# Patient Record
Sex: MALE | Race: WHITE
[De-identification: names, ages, dates, MRNs, and addresses within clinical notes are randomized per-mention and may not be internally consistent; named-entity substitution may affect disease eponyms.]

---
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
## 2024-04-21 ENCOUNTER — HOSPITAL ENCOUNTER (EMERGENCY)
Dept: HOSPITAL 26 - MED | Age: 19
Discharge: HOME | End: 2024-04-21
Payer: COMMERCIAL

## 2024-04-21 VITALS
TEMPERATURE: 98.1 F | DIASTOLIC BLOOD PRESSURE: 76 MMHG | RESPIRATION RATE: 18 BRPM | SYSTOLIC BLOOD PRESSURE: 131 MMHG | OXYGEN SATURATION: 97 % | HEART RATE: 81 BPM

## 2024-04-21 VITALS
TEMPERATURE: 98.3 F | SYSTOLIC BLOOD PRESSURE: 133 MMHG | HEART RATE: 88 BPM | OXYGEN SATURATION: 99 % | DIASTOLIC BLOOD PRESSURE: 60 MMHG | RESPIRATION RATE: 20 BRPM

## 2024-04-21 VITALS — HEIGHT: 72 IN | WEIGHT: 243 LBS | BODY MASS INDEX: 32.91 KG/M2

## 2024-04-21 DIAGNOSIS — L60.0: Primary | ICD-10-CM

## 2024-04-21 DIAGNOSIS — Z79.899: ICD-10-CM

## 2024-04-21 RX ADMIN — LIDOCAINE HYDROCHLORIDE ONE MG: 20 INJECTION, SOLUTION INFILTRATION; PERINEURAL at 11:14

## 2024-07-23 ENCOUNTER — HOSPITAL ENCOUNTER (EMERGENCY)
Dept: HOSPITAL 26 - MED | Age: 19
Discharge: HOME | End: 2024-07-23
Payer: COMMERCIAL

## 2024-07-23 VITALS
TEMPERATURE: 98.1 F | HEART RATE: 66 BPM | DIASTOLIC BLOOD PRESSURE: 82 MMHG | OXYGEN SATURATION: 99 % | RESPIRATION RATE: 18 BRPM | SYSTOLIC BLOOD PRESSURE: 133 MMHG

## 2024-07-23 VITALS
HEART RATE: 98 BPM | TEMPERATURE: 98.1 F | SYSTOLIC BLOOD PRESSURE: 137 MMHG | DIASTOLIC BLOOD PRESSURE: 88 MMHG | RESPIRATION RATE: 18 BRPM | OXYGEN SATURATION: 99 %

## 2024-07-23 VITALS — WEIGHT: 250 LBS | HEIGHT: 72 IN | BODY MASS INDEX: 33.86 KG/M2

## 2024-07-23 DIAGNOSIS — I10: ICD-10-CM
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
## 2024-07-23 DIAGNOSIS — Z79.1: ICD-10-CM

## 2024-07-23 DIAGNOSIS — L60.0: ICD-10-CM

## 2024-07-23 DIAGNOSIS — Z79.899: ICD-10-CM

## 2024-07-23 DIAGNOSIS — S90.112A: Primary | ICD-10-CM
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
## 2024-07-23 DIAGNOSIS — Z88.1: ICD-10-CM

## 2024-07-23 RX ADMIN — ACETAMINOPHEN ONE MG: 500 TABLET ORAL at 18:57

## 2024-07-23 RX ADMIN — IBUPROFEN ONE MG: 600 TABLET ORAL at 18:56
